# Patient Record
Sex: FEMALE | Race: BLACK OR AFRICAN AMERICAN | NOT HISPANIC OR LATINO | URBAN - METROPOLITAN AREA
[De-identification: names, ages, dates, MRNs, and addresses within clinical notes are randomized per-mention and may not be internally consistent; named-entity substitution may affect disease eponyms.]

---

## 2022-04-18 PROBLEM — N92.6 IRREGULAR PERIODS: Status: ACTIVE | Noted: 2022-04-18

## 2023-07-21 ENCOUNTER — HOSPITAL ENCOUNTER (OUTPATIENT)
Dept: RADIOLOGY | Facility: HOSPITAL | Age: 33
Discharge: HOME | End: 2023-07-21
Attending: INTERNAL MEDICINE
Payer: COMMERCIAL

## 2023-07-21 DIAGNOSIS — R10.31 RIGHT LOWER QUADRANT ABDOMINAL PAIN: ICD-10-CM

## 2023-07-21 DIAGNOSIS — R10.32 LEFT LOWER QUADRANT PAIN: ICD-10-CM

## 2023-07-21 PROCEDURE — 74177 CT ABD & PELVIS W/CONTRAST: CPT | Mod: 26,,, | Performed by: RADIOLOGY

## 2023-07-21 PROCEDURE — 25500020 PHARM REV CODE 255: Performed by: INTERNAL MEDICINE

## 2023-07-21 PROCEDURE — 74177 CT ABD & PELVIS W/CONTRAST: CPT | Mod: TC

## 2023-07-21 PROCEDURE — 74177 CT ABDOMEN PELVIS WITH CONTRAST: ICD-10-PCS | Mod: 26,,, | Performed by: RADIOLOGY

## 2023-07-21 PROCEDURE — A9698 NON-RAD CONTRAST MATERIALNOC: HCPCS | Performed by: INTERNAL MEDICINE

## 2023-07-21 RX ADMIN — IOHEXOL 1000 ML: 12 SOLUTION ORAL at 09:07

## 2023-07-21 RX ADMIN — IOHEXOL 75 ML: 350 INJECTION, SOLUTION INTRAVENOUS at 10:07

## 2023-07-24 NOTE — PROGRESS NOTES
Nan,  The ct scan shows intussusception.  This is seen with children, but can be seen in adults.  The colon is sliding in on itself.  I will refer you to a surgeon and to a GI specialist.  I will call you tomorrow to discuss further.

## 2024-05-31 PROBLEM — Z11.3 SCREENING EXAMINATION FOR VENEREAL DISEASE: Status: ACTIVE | Noted: 2024-05-31

## 2024-05-31 PROBLEM — Z13.31 POSITIVE DEPRESSION SCREENING: Status: ACTIVE | Noted: 2024-05-31

## 2024-05-31 PROBLEM — F32.1 CURRENT MODERATE EPISODE OF MAJOR DEPRESSIVE DISORDER WITHOUT PRIOR EPISODE: Status: ACTIVE | Noted: 2024-05-31

## 2024-05-31 PROBLEM — F41.1 GAD (GENERALIZED ANXIETY DISORDER): Status: ACTIVE | Noted: 2024-05-31

## 2025-04-03 PROBLEM — N92.6 IRREGULAR PERIODS: Status: RESOLVED | Noted: 2022-04-18 | Resolved: 2025-04-03

## 2025-04-03 PROBLEM — Z13.31 POSITIVE DEPRESSION SCREENING: Status: RESOLVED | Noted: 2024-05-31 | Resolved: 2025-04-03

## 2025-04-03 PROBLEM — Z11.3 SCREENING EXAMINATION FOR VENEREAL DISEASE: Status: RESOLVED | Noted: 2024-05-31 | Resolved: 2025-04-03

## 2025-07-05 PROBLEM — I83.93 ASYMPTOMATIC VARICOSE VEINS OF BOTH LOWER EXTREMITIES: Status: ACTIVE | Noted: 2025-07-05

## 2025-07-10 ENCOUNTER — OFFICE VISIT (OUTPATIENT)
Dept: OBSTETRICS AND GYNECOLOGY | Facility: CLINIC | Age: 35
End: 2025-07-10
Payer: COMMERCIAL

## 2025-07-10 VITALS
DIASTOLIC BLOOD PRESSURE: 60 MMHG | BODY MASS INDEX: 27.33 KG/M2 | SYSTOLIC BLOOD PRESSURE: 118 MMHG | WEIGHT: 169.31 LBS

## 2025-07-10 DIAGNOSIS — N92.6 IRREGULAR MENSES: ICD-10-CM

## 2025-07-10 DIAGNOSIS — R10.2 PELVIC PAIN IN FEMALE: ICD-10-CM

## 2025-07-10 DIAGNOSIS — Z01.419 ENCOUNTER FOR GYNECOLOGICAL EXAMINATION WITHOUT ABNORMAL FINDING: Primary | ICD-10-CM

## 2025-07-10 DIAGNOSIS — Z00.00 ROUTINE GENERAL MEDICAL EXAMINATION AT A HEALTH CARE FACILITY: ICD-10-CM

## 2025-07-10 PROCEDURE — 99999 PR PBB SHADOW E&M-EST. PATIENT-LVL III: CPT | Mod: PBBFAC,,, | Performed by: NURSE PRACTITIONER

## 2025-07-10 NOTE — PROGRESS NOTES
CC: Well woman exam    Nan Chau is a 35 y.o. female  presents for well woman exam.  LMP: Patient's last menstrual period was 2025 (approximate)..    Hx of btl for contraception   Last gyn exam and pap about 5 yrs ago    Pt also complaints today of:    Cycle will start and be heavy for 2-3 days then spotting 2-3 days then flow again heavy for 2 days      Health Maintenance Topics with due status: Not Due       Topic Last Completion Date    TETANUS VACCINE 2019    Influenza Vaccine 10/26/2022    Hemoglobin A1c (Diabetic Prevention Screening) 2025    RSV Vaccine (Age 60+ and Pregnant patients) Not Due     History reviewed. No pertinent past medical history.  Past Surgical History:   Procedure Laterality Date     SECTION  2019    TUBAL LIGATION  2019     Social History[1]  Family History   Problem Relation Name Age of Onset    Hypertension Paternal Grandmother      Birth defects Paternal Grandmother       OB History          3    Para        Term                AB        Living   3         SAB        IAB        Ectopic        Multiple        Live Births                     /60   Wt 76.8 kg (169 lb 5 oz)   LMP 2025 (Approximate)   BMI 27.33 kg/m²       ROS:  Per hpi    PHYSICAL EXAM:  APPEARANCE: Well nourished, well developed, in no acute distress.  AFFECT: WNL, alert and oriented x 3  SKIN: No acne or hirsutism  NECK: Neck symmetric without masses or thyromegaly  NODES: No inguinal, cervical, axillary, or femoral lymph node enlargement  CHEST: Good respiratory effect  ABDOMEN: Soft.  No tenderness or masses.  No hepatosplenomegaly.  No hernias.  BREASTS: Symmetrical, no skin changes or visible lesions.  No palpable masses, nipple discharge bilaterally.  PELVIC: Normal external genitalia without lesions.  Normal hair distribution.  Adequate perineal body, normal urethral meatus.  Vagina moist and well rugated without lesions or discharge.   Cervix pink, without lesions, discharge or tenderness.  No significant cystocele or rectocele.  Bimanual exam shows uterus to be 8-10 size, regular, mobile and tender.  Adnexa without masses or tenderness.    EXTREMITIES: No edema.  Physical Exam    1. Encounter for gynecological examination without abnormal finding  Liquid-Based Pap Smear, Screening      2. Routine general medical examination at a health care facility  Ambulatory referral/consult to Obstetrics / Gynecology    fbw      3. Pelvic pain in female  US Pelvis Comp with Transvag NON-OB (xpd      4. Irregular menses  US Pelvis Comp with Transvag NON-OB (xpd       AND PLAN:    Nan was seen today for gynecologic exam.    Diagnoses and all orders for this visit:    Encounter for gynecological examination without abnormal finding  -     Liquid-Based Pap Smear, Screening    Routine general medical examination at a health care facility  Comments:  fbw  Orders:  -     Ambulatory referral/consult to Obstetrics / Gynecology    Pelvic pain in female  -     US Pelvis Comp with Transvag NON-OB (xpd; Future    Irregular menses  -     US Pelvis Comp with Transvag NON-OB (xpd; Future     Check u/s due to bleeding changes and tenderness on exam of uterus - probably fibroid     Patient was counseled today on A.C.S. Pap guidelines and recommendations for yearly pelvic exams, mammograms and monthly self breast exams; to see her PCP for other health maintenance.                          [1]   Social History  Socioeconomic History    Marital status:    Tobacco Use    Smoking status: Never    Smokeless tobacco: Never   Substance and Sexual Activity    Alcohol use: Yes     Comment: Socially    Drug use: Never    Sexual activity: Yes